# Patient Record
Sex: FEMALE | URBAN - METROPOLITAN AREA
[De-identification: names, ages, dates, MRNs, and addresses within clinical notes are randomized per-mention and may not be internally consistent; named-entity substitution may affect disease eponyms.]

---

## 2021-04-26 ENCOUNTER — APPOINTMENT (RX ONLY)
Dept: URBAN - METROPOLITAN AREA CLINIC 31 | Facility: CLINIC | Age: 36
Setting detail: DERMATOLOGY
End: 2021-04-26

## 2021-04-26 DIAGNOSIS — M79.3 PANNICULITIS, UNSPECIFIED: ICD-10-CM

## 2021-04-26 PROBLEM — L30.9 DERMATITIS, UNSPECIFIED: Status: ACTIVE | Noted: 2021-04-26

## 2021-04-26 PROCEDURE — 99202 OFFICE O/P NEW SF 15 MIN: CPT

## 2021-04-26 PROCEDURE — ? DEFER

## 2021-04-26 PROCEDURE — ? ADDITIONAL NOTES

## 2021-04-26 PROCEDURE — ? COUNSELING

## 2021-04-26 ASSESSMENT — LOCATION DETAILED DESCRIPTION DERM: LOCATION DETAILED: LEFT ANTERIOR PROXIMAL THIGH

## 2021-04-26 ASSESSMENT — LOCATION SIMPLE DESCRIPTION DERM: LOCATION SIMPLE: LEFT THIGH

## 2021-04-26 ASSESSMENT — LOCATION ZONE DERM: LOCATION ZONE: LEG

## 2021-04-26 NOTE — HPI: SKIN LESION
How Severe Is Your Skin Lesion?: mild
Has Your Skin Lesion Been Treated?: not been treated
Is This A New Presentation, Or A Follow-Up?: Skin Lesion
Additional History: Patient had similar growths removed previously and they were benign tumors. She was told they were triggered by cold conditions

## 2021-04-26 NOTE — PROCEDURE: DEFER
Detail Level: Simple
Introduction Text (Please End With A Colon): The following procedure was deferred:
Instructions (Optional): Recommended a punch biopsy to confirm diagnosis. Pt declines biopsy. She only wants the lesion removed as she has had done in the past. I referred her to Dr Diaz for evaluation and removal

## 2022-05-27 NOTE — PROCEDURE: COUNSELING
Patient was advised of below recommendations per   Patient understood recommendations without questions.     Pain management referral placed and script sent to patient preferred pharmacy.     Detail Level: Detailed

## 2024-10-25 ENCOUNTER — TELEPHONE (OUTPATIENT)
Age: 39
End: 2024-10-25

## 2024-10-25 NOTE — TELEPHONE ENCOUNTER
Pt would like to make 2 back to back new patient appts in January is fine - for Raeann Sylvester.  Pt had to go didn't get insurance info.  Can someone call her back to schedule new patient appts for her and her  below info for him.    Vikki Pastrana  Male, 40 y.o., 10/20/1984

## 2025-05-08 ENCOUNTER — OFFICE VISIT (OUTPATIENT)
Dept: FAMILY MEDICINE CLINIC | Facility: CLINIC | Age: 40
End: 2025-05-08
Payer: COMMERCIAL

## 2025-05-08 ENCOUNTER — APPOINTMENT (OUTPATIENT)
Dept: RADIOLOGY | Facility: CLINIC | Age: 40
End: 2025-05-08
Payer: COMMERCIAL

## 2025-05-08 VITALS
OXYGEN SATURATION: 100 % | BODY MASS INDEX: 25.62 KG/M2 | HEART RATE: 70 BPM | RESPIRATION RATE: 16 BRPM | WEIGHT: 163.2 LBS | DIASTOLIC BLOOD PRESSURE: 84 MMHG | HEIGHT: 67 IN | SYSTOLIC BLOOD PRESSURE: 136 MMHG | TEMPERATURE: 98.2 F

## 2025-05-08 DIAGNOSIS — L85.3 DRY SKIN DERMATITIS: ICD-10-CM

## 2025-05-08 DIAGNOSIS — M54.12 CERVICAL RADICULOPATHY: ICD-10-CM

## 2025-05-08 DIAGNOSIS — R59.9 ENLARGED LYMPH NODES: ICD-10-CM

## 2025-05-08 DIAGNOSIS — D35.2 PITUITARY MICROADENOMA (HCC): Chronic | ICD-10-CM

## 2025-05-08 DIAGNOSIS — N83.201 UNSPECIFIED OVARIAN CYST, RIGHT SIDE: Primary | ICD-10-CM

## 2025-05-08 PROBLEM — E24.9 CUSHING'S SYNDROME (HCC): Status: ACTIVE | Noted: 2025-05-08

## 2025-05-08 PROBLEM — Z86.32 PERSONAL HISTORY OF GESTATIONAL DIABETES: Status: ACTIVE | Noted: 2023-03-03

## 2025-05-08 PROCEDURE — 72050 X-RAY EXAM NECK SPINE 4/5VWS: CPT

## 2025-05-08 PROCEDURE — 99204 OFFICE O/P NEW MOD 45 MIN: CPT | Performed by: FAMILY MEDICINE

## 2025-05-08 RX ORDER — HYDROCORTISONE 25 MG/G
OINTMENT TOPICAL 2 TIMES DAILY
Qty: 20 G | Refills: 0 | Status: SHIPPED | OUTPATIENT
Start: 2025-05-08

## 2025-05-08 NOTE — PROGRESS NOTES
Agustina Phillips 1985 female MRN: [id number]    Family Medicine New Patient    ASSESSMENT/PLAN  Problem List Items Addressed This Visit          Endocrine    Pituitary microadenoma (HCC) (Chronic)    Patient reports this history  States she saw endocrine and work up was normal  She was told no further testing was needed            Unspecified ovarian cyst, right side - Primary    Follows with kishor Cardenas  Last visit 3/2025              Nervous and Auditory    Cervical radiculopathy    Relevant Orders    XR spine cervical complete 4 or 5 vw non injury    Ambulatory Referral to Physical Therapy       Immune and Lymphatic    Enlarged lymph nodes    Patient reports concerns over swollen lymph nodes in her axilla following illness  I discussed with her that enlarged lymph nodes are common with illness  She feels better currently  Exam reassuring  Advised her that I would check labs  She had blood work done via work in March and she will get me those results first   She also reports she had mammogram and this was normal and included her axilla as well          Relevant Orders    Comprehensive metabolic panel    CBC and differential    Sedimentation rate, automated    BENITO Screen w/Reflex Camilla     Other Visit Diagnoses         Dry skin dermatitis        Relevant Medications    hydrocortisone 2.5 % ointment                     No future appointments.       SUBJECTIVE  CC: Establish care (Needs check up for work )      HPI:  Agustina Phillips is a 39 y.o. 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   HENT:  Negative for congestion, postnasal drip, rhinorrhea and sinus pressure.    Eyes:  Negative for photophobia and  "visual disturbance.   Respiratory:  Negative for cough and shortness of breath.    Cardiovascular:  Negative for chest pain, palpitations and leg swelling.   Gastrointestinal:  Negative for abdominal pain, constipation, diarrhea, nausea and vomiting.   Genitourinary:  Negative for difficulty urinating and dysuria.   Musculoskeletal:  Negative for arthralgias and myalgias.   Skin:  Negative for color change and rash.   Neurological:  Negative for dizziness, weakness, light-headedness and headaches.       Historical Information   The patient history was reviewed as follows:    History reviewed. No pertinent past medical history.  History reviewed. No pertinent surgical history.  History reviewed. No pertinent family history.   Social History   Social History     Substance and Sexual Activity   Alcohol Use Never     Social History     Substance and Sexual Activity   Drug Use Never     Social History     Tobacco Use   Smoking Status Never   Smokeless Tobacco Never       Medications:     Current Outpatient Medications:     hydrocortisone 2.5 % ointment, Apply topically 2 (two) times a day, Disp: 20 g, Rfl: 0    Multiple Vitamin (MULTIVITAMIN ADULT PO), Take by mouth, Disp: , Rfl:   Allergies   Allergen Reactions    Bee Venom Anaphylaxis    Honey - Food Allergy Anaphylaxis    Hepatitis B Vaccine Recomb (3-Antigen) Hives     2011       Became hypotensive, had fever, urticaria       OBJECTIVE    Vitals:   Vitals:    05/08/25 1146   BP: 136/84   BP Location: Right arm   Patient Position: Sitting   Cuff Size: Standard   Pulse: 70   Resp: 16   Temp: 98.2 °F (36.8 °C)   TempSrc: Tympanic   SpO2: 100%   Weight: 74 kg (163 lb 3.2 oz)   Height: 5' 6.5\" (1.689 m)           Physical Exam  Constitutional:       Appearance: She is well-developed.   HENT:      Head: Normocephalic and atraumatic.   Cardiovascular:      Rate and Rhythm: Normal rate and regular rhythm.      Heart sounds: Normal heart sounds.   Pulmonary:      Effort: " Pulmonary effort is normal. No respiratory distress.      Breath sounds: Normal breath sounds. No wheezing.   Abdominal:      General: Bowel sounds are normal. There is no distension.      Palpations: Abdomen is soft.      Tenderness: There is no abdominal tenderness.   Musculoskeletal:         General: No tenderness. Normal range of motion.      Cervical back: Normal range of motion and neck supple.   Skin:     General: Skin is warm and dry.   Neurological:      Mental Status: She is alert and oriented to person, place, and time.   Psychiatric:         Behavior: Behavior normal.            Labs:        Raeann Sylvester DO    5/8/2025

## 2025-05-08 NOTE — ASSESSMENT & PLAN NOTE
Patient reports this history  States she saw endocrine and work up was normal  She was told no further testing was needed

## 2025-05-08 NOTE — ASSESSMENT & PLAN NOTE
Patient reports concerns over swollen lymph nodes in her axilla following illness  I discussed with her that enlarged lymph nodes are common with illness  She feels better currently  Exam reassuring  Advised her that I would check labs  She had blood work done via work in March and she will get me those results first   She also reports she had mammogram and this was normal and included her axilla as well

## 2025-05-09 ENCOUNTER — RESULTS FOLLOW-UP (OUTPATIENT)
Dept: FAMILY MEDICINE CLINIC | Facility: CLINIC | Age: 40
End: 2025-05-09

## 2025-06-03 DIAGNOSIS — R76.8 POSITIVE ANA (ANTINUCLEAR ANTIBODY): Primary | ICD-10-CM

## 2025-06-10 ENCOUNTER — OFFICE VISIT (OUTPATIENT)
Dept: RHEUMATOLOGY | Facility: CLINIC | Age: 40
End: 2025-06-10
Payer: COMMERCIAL

## 2025-06-10 VITALS
DIASTOLIC BLOOD PRESSURE: 80 MMHG | BODY MASS INDEX: 26.12 KG/M2 | SYSTOLIC BLOOD PRESSURE: 120 MMHG | HEART RATE: 71 BPM | HEIGHT: 67 IN | OXYGEN SATURATION: 99 % | WEIGHT: 166.4 LBS

## 2025-06-10 DIAGNOSIS — U09.9 CHRONIC POST-COVID-19 SYNDROME: Primary | ICD-10-CM

## 2025-06-10 DIAGNOSIS — R76.8 POSITIVE ANA (ANTINUCLEAR ANTIBODY): ICD-10-CM

## 2025-06-10 PROCEDURE — 99204 OFFICE O/P NEW MOD 45 MIN: CPT | Performed by: INTERNAL MEDICINE

## 2025-06-10 RX ORDER — PRENATAL WITH FERROUS FUM AND FOLIC ACID 3080; 920; 120; 400; 22; 1.84; 3; 20; 10; 1; 12; 200; 27; 25; 2 [IU]/1; [IU]/1; MG/1; [IU]/1; MG/1; MG/1; MG/1; MG/1; MG/1; MG/1; UG/1; MG/1; MG/1; MG/1; MG/1
TABLET ORAL
COMMUNITY
Start: 2025-06-05

## 2025-06-10 NOTE — PROGRESS NOTES
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 Sylvester     History of Present Illness   HPI  Agustina Phillips is a 39 y.o. female who presents for evaluation + BENITO   History obtained from: patient    2024 she mentioned she had COVID. Since then she feels \"I have not recovered\"    She feels since then she has low grade fever in evening, arthralgias, myalgias, weakness.    + Numbness LUE extending to left scapula and chest wall     Right upper quadrant abdominal pain    Pain varies from 2- 7 intensity     \"Sometimes these symptoms are better and someday worse\"    No current rashes, Raynaud's, renal failure, PE/DVTs, seizures or strokes         Review of Systems  Pertinent Medical History           --------------------------------------------------------------------------------------------------------        ROS:        All other ROS was reviewed and negative except as above         --------------------------------------------------------------------------------------------------------    Past Medical History    COVID        Past Surgical History    Tonsillectomy         Family History    Mother post strep nephritis         Social History    Social History  Tobacco Use    Smoking status: Never    Smokeless tobacco: Never   Vaping Use    Vaping status: Never Used " "  Substance Use Topics    Alcohol use: Never    Drug use: Never     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)   Allergies    Allergies  Allergen Reactions    Bee Venom Anaphylaxis    Honey - Food Allergy Anaphylaxis    Hepatitis B Vaccine Recomb (3-Antigen) Hives     2011       Became hypotensive, had fever, urticaria         Medications    Current Outpatient Medications   Medication Instructions    hydrocortisone 2.5 % ointment Topical, 2 times daily    Multiple Vitamin (MULTIVITAMIN ADULT PO) Take by mouth    Prenatal 27-1 MG TABS           ________________________________________________________________________      Results Review    + BENITO 1:320    Complete BENITO profile negative    CBC, CMP ESR normal             Objective   /80   Pulse 71   Ht 5' 6.5\" (1.689 m)   Wt 75.5 kg (166 lb 6.4 oz)   SpO2 99%   BMI 26.46 kg/m²      Physical Exam    GEN: AAO, No apparent distress.  Patient is well developed.  HEENT:  Pupils are equal, round and reactive.  Sclera are clear.  Fundoscopic exam is normal.  External ears are without lesions.  Oral pharynx is clear of ulcers or other lesions.  MMM.   NECK:  Supple.  There is no adenopathy appreciable in anterior or posterior cervical chains or supraclavicularly.  JVP is normal.    HEART: Regular rate and rhythm.  There is no appreciable murmur, gallop or rub.  LUNGS: Clear to auscultation.  ABD:  Soft, without tenderness, rebound or guarding.  No appreciable organomegally.  NEURO: Speech and cognition are normal.  Strength is 5/5 throughout.  Tone is normal.  DTRs are 2/4 at the knees, ankles and elbows.  Gait is normal.  SKIN: There are no rashes or lesions    MUSCULOSKELETAL:   -Hands: normal  -Wrists: normal  -Elbows: normal  -Shoulders: normal  -Neck: normal  -Back: normal  -Hips: normal  -Knees: normal  -Ankles: normal  -Feet: normal    Thank you for involving me in this patient's care.        Elias Bingham MD  [facility]      "

## 2025-06-11 ENCOUNTER — PATIENT MESSAGE (OUTPATIENT)
Dept: RHEUMATOLOGY | Facility: CLINIC | Age: 40
End: 2025-06-11

## 2025-06-12 LAB
B BURGDOR IGG+IGM SER QL IA: NEGATIVE
CCP IGA+IGG SERPL IA-ACNC: 4 UNITS (ref 0–19)
HCV AB S/CO SERPL IA: NON REACTIVE
RHEUMATOID FACT SERPL-ACNC: <10 IU/ML

## 2025-07-28 ENCOUNTER — OFFICE VISIT (OUTPATIENT)
Dept: FAMILY MEDICINE CLINIC | Facility: CLINIC | Age: 40
End: 2025-07-28
Payer: COMMERCIAL

## 2025-07-28 VITALS
WEIGHT: 170.8 LBS | DIASTOLIC BLOOD PRESSURE: 82 MMHG | HEIGHT: 67 IN | BODY MASS INDEX: 26.81 KG/M2 | OXYGEN SATURATION: 99 % | SYSTOLIC BLOOD PRESSURE: 120 MMHG | TEMPERATURE: 98 F | HEART RATE: 84 BPM | RESPIRATION RATE: 16 BRPM

## 2025-07-28 DIAGNOSIS — M35.3 POLYMYALGIA (HCC): ICD-10-CM

## 2025-07-28 DIAGNOSIS — R76.8 POSITIVE ANA (ANTINUCLEAR ANTIBODY): Primary | ICD-10-CM

## 2025-07-28 DIAGNOSIS — M25.532 LEFT WRIST PAIN: ICD-10-CM

## 2025-07-28 DIAGNOSIS — L73.9 FOLLICULITIS: ICD-10-CM

## 2025-07-28 PROCEDURE — 99214 OFFICE O/P EST MOD 30 MIN: CPT | Performed by: FAMILY MEDICINE

## 2025-07-28 RX ORDER — PREDNISONE 10 MG/1
TABLET ORAL
Qty: 21 TABLET | Refills: 0 | Status: SHIPPED | OUTPATIENT
Start: 2025-07-28

## 2025-07-28 RX ORDER — DOXYCYCLINE 100 MG/1
100 CAPSULE ORAL EVERY 12 HOURS SCHEDULED
Qty: 14 CAPSULE | Refills: 0 | Status: SHIPPED | OUTPATIENT
Start: 2025-07-28 | End: 2025-08-04

## 2025-08-19 ENCOUNTER — TELEPHONE (OUTPATIENT)
Age: 40
End: 2025-08-19